# Patient Record
Sex: FEMALE | Race: WHITE | NOT HISPANIC OR LATINO | Employment: OTHER | URBAN - METROPOLITAN AREA
[De-identification: names, ages, dates, MRNs, and addresses within clinical notes are randomized per-mention and may not be internally consistent; named-entity substitution may affect disease eponyms.]

---

## 2018-10-02 ENCOUNTER — HOSPITAL ENCOUNTER (EMERGENCY)
Facility: MEDICAL CENTER | Age: 33
End: 2018-10-03
Attending: EMERGENCY MEDICINE
Payer: COMMERCIAL

## 2018-10-02 DIAGNOSIS — F11.93 HEROIN WITHDRAWAL (HCC): ICD-10-CM

## 2018-10-02 PROCEDURE — 99284 EMERGENCY DEPT VISIT MOD MDM: CPT

## 2018-10-02 PROCEDURE — 700111 HCHG RX REV CODE 636 W/ 250 OVERRIDE (IP): Performed by: EMERGENCY MEDICINE

## 2018-10-02 PROCEDURE — 700102 HCHG RX REV CODE 250 W/ 637 OVERRIDE(OP): Performed by: EMERGENCY MEDICINE

## 2018-10-02 PROCEDURE — 700105 HCHG RX REV CODE 258: Performed by: EMERGENCY MEDICINE

## 2018-10-02 PROCEDURE — A9270 NON-COVERED ITEM OR SERVICE: HCPCS | Performed by: EMERGENCY MEDICINE

## 2018-10-02 RX ORDER — LORAZEPAM 1 MG/1
1 TABLET ORAL ONCE
Status: COMPLETED | OUTPATIENT
Start: 2018-10-02 | End: 2018-10-02

## 2018-10-02 RX ORDER — ONDANSETRON 4 MG/1
8 TABLET, ORALLY DISINTEGRATING ORAL ONCE
Status: COMPLETED | OUTPATIENT
Start: 2018-10-02 | End: 2018-10-02

## 2018-10-02 RX ORDER — ONDANSETRON 4 MG/1
4 TABLET, ORALLY DISINTEGRATING ORAL EVERY 6 HOURS PRN
Qty: 10 TAB | Refills: 0 | Status: SHIPPED | OUTPATIENT
Start: 2018-10-02 | End: 2018-10-03 | Stop reason: SDUPTHER

## 2018-10-02 RX ORDER — HYDROXYZINE 50 MG/1
50 TABLET, FILM COATED ORAL 3 TIMES DAILY PRN
Qty: 30 TAB | Refills: 0 | Status: SHIPPED | OUTPATIENT
Start: 2018-10-02 | End: 2018-10-03 | Stop reason: SDUPTHER

## 2018-10-02 RX ORDER — DIPHENHYDRAMINE HCL 25 MG
50 TABLET ORAL ONCE
Status: COMPLETED | OUTPATIENT
Start: 2018-10-02 | End: 2018-10-02

## 2018-10-02 RX ORDER — SODIUM CHLORIDE 9 MG/ML
1000 INJECTION, SOLUTION INTRAVENOUS ONCE
Status: COMPLETED | OUTPATIENT
Start: 2018-10-02 | End: 2018-10-03

## 2018-10-02 RX ADMIN — SODIUM CHLORIDE 1000 ML: 9 INJECTION, SOLUTION INTRAVENOUS at 23:25

## 2018-10-02 RX ADMIN — ONDANSETRON 8 MG: 4 TABLET, ORALLY DISINTEGRATING ORAL at 22:57

## 2018-10-02 RX ADMIN — CLONIDINE 1 PATCH: 0.1 PATCH, EXTENDED RELEASE TRANSDERMAL at 23:00

## 2018-10-02 RX ADMIN — LORAZEPAM 1 MG: 1 TABLET ORAL at 23:05

## 2018-10-02 RX ADMIN — DIPHENHYDRAMINE HCL 50 MG: 25 TABLET ORAL at 23:05

## 2018-10-02 ASSESSMENT — ENCOUNTER SYMPTOMS: PALPITATIONS: 0

## 2018-10-03 ENCOUNTER — TELEPHONE (OUTPATIENT)
Dept: PHARMACY | Facility: MEDICAL CENTER | Age: 33
End: 2018-10-03

## 2018-10-03 VITALS
OXYGEN SATURATION: 94 % | HEIGHT: 63 IN | TEMPERATURE: 96.5 F | HEART RATE: 106 BPM | WEIGHT: 109.35 LBS | BODY MASS INDEX: 19.38 KG/M2 | SYSTOLIC BLOOD PRESSURE: 114 MMHG | RESPIRATION RATE: 16 BRPM | DIASTOLIC BLOOD PRESSURE: 64 MMHG

## 2018-10-03 RX ORDER — HYDROXYZINE 50 MG/1
50 TABLET, FILM COATED ORAL 3 TIMES DAILY PRN
Qty: 30 TAB | Refills: 0 | Status: SHIPPED | OUTPATIENT
Start: 2018-10-03 | End: 2019-05-09

## 2018-10-03 RX ORDER — ONDANSETRON 4 MG/1
4 TABLET, ORALLY DISINTEGRATING ORAL EVERY 6 HOURS PRN
Qty: 10 TAB | Refills: 0 | Status: SHIPPED | OUTPATIENT
Start: 2018-10-03 | End: 2019-05-09

## 2018-10-03 NOTE — ED PROVIDER NOTES
"ED Provider Note    CHIEF COMPLAINT  Chief Complaint   Patient presents with   • Detox     Pt states, \"I'm detoxing from heroin.\" Pt reports last use 1200 10/1.        HPI  Kim Acevedo is a 33 y.o. female who presents for evaluation for heroin withdrawal.  Patient last use was a day ago.  She reports symptoms which are typical of prior heroin withdrawals.  Symptoms include leg cramps, nausea and vomiting chills.  Emesis is nonbloody nonbilious; mild associated diarrhea without any blood or melena.  No significant abdominal pain.  She denies any fevers.  Patient reports that she has gone through withdrawals many times before and this feels identical.  She denies any chest pain or shortness of breath.  She reports she feels mildly anxious.  She denies any suicidal or homicidal ideation.  She denies any fevers.  Patient also recreationally uses methamphetamine and cocaine last use was a day ago.  She denies any benzodiazepine or alcohol abuse.  Route of administration is IV.      REVIEW OF SYSTEMS  Review of Systems   Cardiovascular: Negative for chest pain and palpitations.   All other systems reviewed and are negative.    See HPI for further details. All other systems are negative.     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    SOCIAL HISTORY  Social History     Social History Main Topics   • Smoking status: Former Smoker     Packs/day: 0.00   • Smokeless tobacco: Never Used   • Alcohol use Yes      Comment: occ   • Drug use: Yes      Comment: heroin   • Sexual activity: Not on file       SURGICAL HISTORY   has a past surgical history that includes dilation and curettage.    CURRENT MEDICATIONS  Home Medications     Reviewed by Mariela Barrios R.N. (Registered Nurse) on 10/02/18 at 4893  Med List Status: Not Addressed   Medication Last Dose Status   Buprenorphine HCl-Naloxone HCl (SUBOXONE) 8-2 MG FILM  Active   Varenicline Tartrate (CHANTIX PO)  Active       "          ALLERGIES  No Known Allergies    PHYSICAL EXAM  Physical Exam   Constitutional: She is oriented to person, place, and time. She appears well-developed and well-nourished.   HENT:   Head: Normocephalic.   Eyes: Pupils are equal, round, and reactive to light. Conjunctivae are normal.   Neck: Normal range of motion. Neck supple.   Cardiovascular: Regular rhythm.    Mild tachycardia   Pulmonary/Chest: Effort normal and breath sounds normal. No respiratory distress. She has no wheezes. She has no rales.   Abdominal: Soft. Bowel sounds are normal. She exhibits no distension. There is no tenderness. There is no rebound and no guarding.   Neurological: She is alert and oriented to person, place, and time.   Skin: Skin is warm and dry. No rash noted.         COURSE & MEDICAL DECISION MAKING  Pertinent Labs & Imaging studies reviewed. (See chart for details)  Patient here with symptoms consistent with heroin withdrawal.  Patient is very well-appearing.  Patient has received IV fluids for her tachycardia likely secondary to dehydration secondary to her episodes of vomiting.  Patient's abdominal exam is entirely benign and I believe a alternative surgical pathology is unlikely.  Patient without any tremor and denies any benzodiazepine or alcohol abuse.  Will treat patient's heroin withdrawal supportively.  Patient has received information on nearby detoxification centers.  She feels safe at home.  Patient feels considerably improved following IV fluids, she is received Zofran in the emergency department and is able to eat without difficulty or emesis.  Patient will receive a prescription of Zofran.  A clonidine patch has been placed on patient and she is tolerated this well without any dizziness or symptoms of orthostasis.  The patient will not drink alcohol nor drive with prescribed medications. The patient will return for worsening symptoms and is stable at the time of discharge. The patient verbalizes understanding  and will comply.    FINAL IMPRESSION  1.  Heroin withdrawal.         Electronically signed by: Greg Cates, 10/2/2018 10:27 PM

## 2018-10-03 NOTE — ED NOTES
[facility] .    Lyndsay Hoffman, PharmD

## 2018-10-03 NOTE — ED NOTES
KINDER FALL RISK       RISK  Present to ED b/c of fall (syncope, seizure, or ALOC) no  Age  >70 no  Altered Mental Status (Intoxicated with alcohol or substance confusion, inability to follow instructions, disorientation) no  Impaired Mobility (Ambulates or transfers with assistive devices or assist. Ambulates with unsteady gait and no assistance.  Unable to ambulate to transfer.) no  Nursing Judgement (Bowel or bladder incontinence, diarrhea, urinary frequency or urgency, leg weakness, orthostatic hypotension, dizziness or vertigo, narcotic use.) no    YES TO ANY RISK = HIGH FALL RISK     Interventions in place marked in RED   1. Move patient closer to nurses stations  2. Familiarize the patient with environment  3. Place call light within reach and demonstrate call light use  4. Keep patients personal possessions within patient safe reach (if appropriate)   5. Place stretcher in low position and brakes locked  6.Place yellow socks and armband on patient   7. Place green triangle on patients door  8. Give patient urinal if applicable  9. Keep floor surfaces clean and dry  10.Keep patient care areas uncluttered  11. Use a lap belt or posey vest   12. Assess patient hourly for :Pain, persona needs, position change, and call light access.

## 2018-10-03 NOTE — ED NOTES
Pt tore off cardiac monitor. Very anxious, not in control of body movements. Asking for aleve or benadryl. Informed pt that an ERP needs to assess her. Pt placed back on cardiac monitor and continuous spO2.

## 2018-10-03 NOTE — ED TRIAGE NOTES
"Kim Acevedo  33 y.o. female  Chief Complaint   Patient presents with   • Detox     Pt states, \"I'm detoxing from heroin.\" Pt reports last use 1200 10/1.        Pt amb to triage with steady gait for above complaint. Pt reports hx of heroin abuse. On suboxone X 2 years. Pt \"slipped\" and has used heroin \"several times daily\" for the past two months. Pt unable to sit still in triage.  Pt is alert and oriented, speaking in full sentences, follows commands and responds appropriately to questions. NAD. Resp are even and unlabored.  Pt placed in lobby. Pt educated on triage process. Pt encouraged to alert staff for any changes.  "

## 2018-10-03 NOTE — ED NOTES
Pt provided with material for detox. Went over d/c instructions with pt. Pt verbalized understanding. Pt to follow up with pcp as needed. Pt ambulated from ED in stable condition

## 2018-10-03 NOTE — ED NOTES
Pt provided with another blanket. Still anxious and rolling around in bed. Will continue to monitor.

## 2019-05-02 PROBLEM — F11.11 NARCOTIC ABUSE IN REMISSION (HCC): Status: ACTIVE | Noted: 2019-05-02

## 2019-05-02 PROBLEM — F17.200 NICOTINE DEPENDENCE: Status: ACTIVE | Noted: 2019-05-02

## 2019-08-15 PROBLEM — F11.90 OPIOID USE DISORDER: Status: ACTIVE | Noted: 2019-08-15

## 2019-11-12 PROBLEM — Z97.5 PRESENCE OF IUD: Status: ACTIVE | Noted: 2019-11-12

## 2019-12-16 PROBLEM — Z79.899 CONTROLLED SUBSTANCE AGREEMENT SIGNED: Status: ACTIVE | Noted: 2019-12-16

## 2019-12-16 PROBLEM — F90.2 ADHD (ATTENTION DEFICIT HYPERACTIVITY DISORDER), COMBINED TYPE: Status: ACTIVE | Noted: 2019-12-16

## 2019-12-16 PROBLEM — Z02.83 ENCOUNTER FOR DRUG SCREENING: Status: ACTIVE | Noted: 2019-12-16

## 2020-07-24 PROBLEM — G89.29 CHRONIC MIDLINE LOW BACK PAIN WITHOUT SCIATICA: Status: ACTIVE | Noted: 2020-07-24

## 2020-07-24 PROBLEM — M54.50 CHRONIC MIDLINE LOW BACK PAIN WITHOUT SCIATICA: Status: ACTIVE | Noted: 2020-07-24

## 2020-11-13 PROBLEM — Z00.00 HEALTH CARE MAINTENANCE: Status: ACTIVE | Noted: 2020-11-13

## 2021-02-22 PROBLEM — F41.9 ANXIETY: Status: ACTIVE | Noted: 2021-02-22

## 2021-04-10 PROBLEM — U07.1 COVID-19 VIRUS INFECTION: Status: ACTIVE | Noted: 2021-04-10

## 2021-06-01 PROBLEM — F15.90 STIMULANT USE DISORDER: Status: ACTIVE | Noted: 2021-06-01

## 2021-08-28 PROBLEM — U07.1 COVID-19 VIRUS INFECTION: Status: RESOLVED | Noted: 2021-04-10 | Resolved: 2021-08-28

## 2021-09-06 ENCOUNTER — HOSPITAL ENCOUNTER (EMERGENCY)
Facility: MEDICAL CENTER | Age: 36
End: 2021-09-06
Attending: EMERGENCY MEDICINE
Payer: COMMERCIAL

## 2021-09-06 VITALS
DIASTOLIC BLOOD PRESSURE: 74 MMHG | HEART RATE: 82 BPM | WEIGHT: 122.36 LBS | HEIGHT: 60 IN | OXYGEN SATURATION: 99 % | SYSTOLIC BLOOD PRESSURE: 102 MMHG | BODY MASS INDEX: 24.02 KG/M2 | RESPIRATION RATE: 16 BRPM | TEMPERATURE: 97.3 F

## 2021-09-06 DIAGNOSIS — N30.00 ACUTE CYSTITIS WITHOUT HEMATURIA: ICD-10-CM

## 2021-09-06 LAB
APPEARANCE UR: CLEAR
BACTERIA #/AREA URNS HPF: ABNORMAL /HPF
BILIRUB UR QL STRIP.AUTO: NEGATIVE
CANDIDA DNA VAG QL PROBE+SIG AMP: NEGATIVE
COLOR UR: ABNORMAL
EPI CELLS #/AREA URNS HPF: ABNORMAL /HPF
G VAGINALIS DNA VAG QL PROBE+SIG AMP: NEGATIVE
GLUCOSE UR STRIP.AUTO-MCNC: NEGATIVE MG/DL
HCG UR QL: NEGATIVE
HYALINE CASTS #/AREA URNS LPF: ABNORMAL /LPF
KETONES UR STRIP.AUTO-MCNC: NEGATIVE MG/DL
LEUKOCYTE ESTERASE UR QL STRIP.AUTO: NEGATIVE
MICRO URNS: ABNORMAL
NITRITE UR QL STRIP.AUTO: POSITIVE
PH UR STRIP.AUTO: 5.5 [PH] (ref 5–8)
PROT UR QL STRIP: NEGATIVE MG/DL
RBC # URNS HPF: ABNORMAL /HPF
RBC UR QL AUTO: ABNORMAL
SP GR UR STRIP.AUTO: 1.03
T VAGINALIS DNA VAG QL PROBE+SIG AMP: NEGATIVE
UROBILINOGEN UR STRIP.AUTO-MCNC: 0.2 MG/DL
WBC #/AREA URNS HPF: ABNORMAL /HPF

## 2021-09-06 PROCEDURE — 87491 CHLMYD TRACH DNA AMP PROBE: CPT

## 2021-09-06 PROCEDURE — 81025 URINE PREGNANCY TEST: CPT

## 2021-09-06 PROCEDURE — 700102 HCHG RX REV CODE 250 W/ 637 OVERRIDE(OP): Performed by: EMERGENCY MEDICINE

## 2021-09-06 PROCEDURE — 87660 TRICHOMONAS VAGIN DIR PROBE: CPT

## 2021-09-06 PROCEDURE — 87480 CANDIDA DNA DIR PROBE: CPT

## 2021-09-06 PROCEDURE — A9270 NON-COVERED ITEM OR SERVICE: HCPCS | Performed by: EMERGENCY MEDICINE

## 2021-09-06 PROCEDURE — 87591 N.GONORRHOEAE DNA AMP PROB: CPT

## 2021-09-06 PROCEDURE — 81001 URINALYSIS AUTO W/SCOPE: CPT

## 2021-09-06 PROCEDURE — 99284 EMERGENCY DEPT VISIT MOD MDM: CPT

## 2021-09-06 PROCEDURE — 87510 GARDNER VAG DNA DIR PROBE: CPT

## 2021-09-06 RX ORDER — CEFDINIR 300 MG/1
300 CAPSULE ORAL 2 TIMES DAILY
Qty: 20 CAPSULE | Refills: 0 | Status: SHIPPED | OUTPATIENT
Start: 2021-09-06 | End: 2021-10-28

## 2021-09-06 RX ORDER — CEFDINIR 300 MG/1
300 CAPSULE ORAL ONCE
Status: COMPLETED | OUTPATIENT
Start: 2021-09-06 | End: 2021-09-06

## 2021-09-06 RX ADMIN — CEFDINIR 300 MG: 300 CAPSULE ORAL at 09:05

## 2021-09-06 ASSESSMENT — FIBROSIS 4 INDEX: FIB4 SCORE: 1.14

## 2021-09-06 NOTE — ED NOTES
Pt ambulated to room from [facility]. Changed into gown. Connected to monitor. Agree with triage note. Chart up for ERP. Call light in reach.

## 2021-09-06 NOTE — ED TRIAGE NOTES
"Chief Complaint   Patient presents with   • Bladder Infection     x 4 days, painful urination, \"smells bad\"   pt reports taking azo and cranberry with no relief.   Reports sleeping x last 4 days and \"feels like a bladder infection\"  /72   Pulse 98   Temp 36.2 °C (97.2 °F) (Temporal)   Resp 16   Ht 1.524 m (5')   Wt 55.5 kg (122 lb 5.7 oz)   LMP 08/07/2021 (Approximate) Comment: iud  SpO2 96%   BMI 23.90 kg/m²     "

## 2021-09-06 NOTE — DISCHARGE INSTRUCTIONS
If you develop multiple sores or vomiting, you will need to return to the emergency department.  We have sent GC chlamydia on you, if this returns positive you should receive a call, you can also get your results on [employer], they should be available within the next 24 hours.  If you develop significant abdominal pain, or worsening symptoms please return to the emergency department.

## 2021-09-06 NOTE — ED PROVIDER NOTES
"ED Provider Note    CHIEF COMPLAINT  Chief Complaint   Patient presents with   • Bladder Infection     x 4 days, painful urination, \"smells bad\"       HPI  Kim Acevedo is a 36 y.o. female who presents with chief complaint of painful urination. Patient reports symptoms for the last 4 days. Patient reports foul smelling urine and increased urgency and frequency.  Patient denies any associated abdominal pain or flank pain.  She does report some mild chills without any associated fever.  Patient denies any associated nausea or vomiting.  No change in bowel movements.  Patient has had some very minimal vaginal discharge, she reports this is essentially normal for her.  She denies any bleeding outside of her cycle or any dyspareunia.  Patient reports she is monogamous with her partner but suspects her partner may be unfaithful.    REVIEW OF SYSTEMS  ROS    See HPI for further details. All other systems are negative.     PAST MEDICAL HISTORY   has a past medical history of Anxiety (2/22/2021) and Narcotic abuse in remission ([facility]).    SOCIAL HISTORY  Social History     Tobacco Use   • Smoking status: Former Smoker     Packs/day: 0.00   • Smokeless tobacco: Never Used   Vaping Use   • Vaping Use: Every day   • Substances: Nicotine, THC   • Devices: Pre-filled or refillable cartridge   Substance and Sexual Activity   • Alcohol use: Not on file   • Drug use: Not Currently     Comment: Heroin,meth   • Sexual activity: Yes     Partners: Male       SURGICAL HISTORY   has a past surgical history that includes dilation and curettage.    CURRENT MEDICATIONS  Home Medications     Reviewed by Elizabeth Cristobal R.N. (Registered Nurse) on 09/06/21 at 0704  Med List Status: Not Addressed   Medication Last Dose Status   amphetamine-dextroamphetamine (ADDERALL, 10MG,) 10 MG Tab  Active   Buprenorphine HCl-Naloxone HCl (SUBOXONE) 8-2 MG FILM  Active   hydrOXYzine HCl (ATARAX) 25 MG Tab  Active   Naloxone (NARCAN) 4 MG/0.1ML Liquid  " Active   Paragard Intrauterine Copper IUD  Active                ALLERGIES  No Known Allergies    PHYSICAL EXAM  Vitals:    09/06/21 0701   BP: 100/72   Pulse: 98   Resp: 16   Temp: 36.2 °C (97.2 °F)   SpO2: 96%       Physical Exam  Constitutional:       Appearance: She is well-developed.   HENT:      Head: Normocephalic and atraumatic.   Eyes:      Conjunctiva/sclera: Conjunctivae normal.   Cardiovascular:      Rate and Rhythm: Normal rate and regular rhythm.   Pulmonary:      Effort: Pulmonary effort is normal.      Breath sounds: Normal breath sounds.   Abdominal:      General: Bowel sounds are normal. There is no distension.      Palpations: Abdomen is soft.      Tenderness: There is no abdominal tenderness. There is no rebound.   Genitourinary:     Comments: Patient requested pelvic exam be deferred  Musculoskeletal:      Cervical back: Normal range of motion and neck supple.   Skin:     General: Skin is warm and dry.      Findings: No rash.   Neurological:      Mental Status: She is alert and oriented to person, place, and time.   Psychiatric:         Behavior: Behavior normal.           DIAGNOSTIC STUDIES / PROCEDURES      LABS  Results for orders placed or performed during the hospital encounter of 09/06/21   URINALYSIS CULTURE, IF INDICATED    Specimen: Urine   Result Value Ref Range    Color DK Yellow     Character Clear     Specific Gravity 1.026 <1.035    Ph 5.5 5.0 - 8.0    Glucose Negative Negative mg/dL    Ketones Negative Negative mg/dL    Protein Negative Negative mg/dL    Bilirubin Negative Negative    Urobilinogen, Urine 0.2 Negative    Nitrite Positive (A) Negative    Leukocyte Esterase Negative Negative    Occult Blood Trace (A) Negative    Micro Urine Req Microscopic    HCG QUALITATIVE UR   Result Value Ref Range    Beta-Hcg Urine Negative Negative   URINE MICROSCOPIC (W/UA)   Result Value Ref Range    WBC 2-5 /hpf    RBC 0-2 /hpf    Bacteria Many (A) None /hpf    Epithelial Cells Few /hpf     Hyaline Cast 0-2 /lpf   Chlamydia/GC PCR Urine Or Swab    Specimen: Urine, First Catch; Genital   Result Value Ref Range    Source Urine    VAGINAL PATHOGENS DNA PANEL   Result Value Ref Range    Candida species DNA Probe Negative Negative    Trichamonas vaginalis DNA Probe Negative Negative    Gardnerella vaginalis DNA Probe Negative Negative           COURSE & MEDICAL DECISION MAKING  Pertinent Labs & Imaging studies reviewed. (See chart for details)    Very well-appearing patient here with symptoms most consistent with cystitis versus developing pyelonephritis.  Patient without any associated CVA tenderness but does have some mild associated chills.  Her urinalysis which resulted prior to me seeing patient is positive for nitrite, though there is not a considerable amount of diarrhea she does have considerable bacteria in her urine and therefore I believe that simple cystitis versus developing pyelonephritis is the most likely diagnosis.  Patient will be given cefdinir as this will cover both cystitis and possible developing pyelonephritis.  Given patient's questions of her partners faithfulness, she is requesting being checked for GC chlamydia.  We will also check trichomonas.  Patient is requesting that we do not perform a pelvic exam, she understands that we do not do a pelvic exam but can miss possible developing PID which can result in development of sepsis or chronic pelvic pain, surgical complications or other morbidity.  Patient understands that and deferring to the exam she must return if symptoms worsen.  I am very encouraged the patient does not have any associated dyspareunia.  We will send GC chlamydia through the urine and trichomonas through wet prep which patient performed on herself with assistance of nurses.  Patient GC chlamydia is pending, wet prep is unremarkable.  Home with cefdinir.  Return precautions discussed.    The patient will return for worsening symptoms and is stable at the time of  discharge. The patient verbalizes understanding and will comply.    FINAL IMPRESSION  1. Acute cystitis without hematuria    Possible developing pyelonephritis         Electronically signed by: Greg Cates M.D., 9/6/2021 8:39 AM

## 2021-09-07 LAB
C TRACH DNA SPEC QL NAA+PROBE: NEGATIVE
N GONORRHOEA DNA SPEC QL NAA+PROBE: NEGATIVE
SPECIMEN SOURCE: NORMAL

## 2021-09-17 NOTE — DISCHARGE PLANNING
RN CM received incoming call from pt requesting lab results. Assisted the pt with My Chart activation to view lab results. Pt reports increased fatigue and concern for unresolved UTI due to non-compliance with PO ABX. RN CM recommended pt follow up with her PCP, UC, or ED.

## 2021-11-27 PROBLEM — Z02.83 ENCOUNTER FOR DRUG SCREENING: Status: RESOLVED | Noted: 2019-12-16 | Resolved: 2021-11-27

## 2022-07-09 PROBLEM — F11.11 NARCOTIC ABUSE IN REMISSION (HCC): Status: RESOLVED | Noted: 2019-05-02 | Resolved: 2022-07-09

## 2023-04-14 PROBLEM — F12.90 MARIJUANA USE: Status: ACTIVE | Noted: 2023-04-14
